# Patient Record
Sex: MALE | Race: WHITE
[De-identification: names, ages, dates, MRNs, and addresses within clinical notes are randomized per-mention and may not be internally consistent; named-entity substitution may affect disease eponyms.]

---

## 2021-05-31 ENCOUNTER — HOSPITAL ENCOUNTER (EMERGENCY)
Dept: HOSPITAL 75 - ER FS | Age: 51
Discharge: HOME | End: 2021-05-31
Payer: COMMERCIAL

## 2021-05-31 VITALS — BODY MASS INDEX: 45.1 KG/M2 | WEIGHT: 315 LBS | HEIGHT: 70 IN

## 2021-05-31 VITALS — SYSTOLIC BLOOD PRESSURE: 149 MMHG | DIASTOLIC BLOOD PRESSURE: 77 MMHG

## 2021-05-31 DIAGNOSIS — S61.240A: Primary | ICD-10-CM

## 2021-05-31 DIAGNOSIS — Z23: ICD-10-CM
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
## 2021-05-31 PROCEDURE — 64450 NJX AA&/STRD OTHER PN/BRANCH: CPT

## 2021-05-31 PROCEDURE — 90715 TDAP VACCINE 7 YRS/> IM: CPT

## 2021-05-31 NOTE — ED UPPER EXTREMITY
General


Chief Complaint:  Foreign Body


Stated Complaint:  RIGHT INDEX FINGER INJ


Nursing Triage Note:  


Patient ambulatory to ED with lodged fish hook in distal right index finger at 


1700.


Nursing Sepsis Screen:  No Definite Risk


Source:  patient





History of Present Illness


Date Seen by Provider:  May 31, 2021


Time Seen by Provider:  19:33


Initial Comments


50-year-old male presenting to the emergency department with complaints of 

fishhook caught in his right index finger.  He had been fishing but this when it

got caught.  He states his last tetanus shot was more than 5 years ago.  He is 

having pain to the index finger.  He is right-hand dominant.  He had this 

happened around 5 PM.  [place].  He has no numbness or tingling to that 

finger.  He has normal range of motion.  There is increased pain with palpation 

and movement especially of the fishhook


Onset:  just prior to arrival (Around 5 PM)


Severity:  moderate


Pain/Injury Location:  right 2nd finger


Modifying Factors:  Improves With Immobilization; Worse With Movement





Allergies and Home Medications


Allergies


Coded Allergies:  


     No Known Drug Allergies (Unverified , 5/31/21)





Home Medications


Cephalexin 500 Mg Capsule, 500 MG PO QID


   Prescribed by: ELIZABETH GASTON on 5/31/21 2028


Levofloxacin 750 Mg Tablet, 750 MG PO DAILY


   Prescribed by: ELIZABETH GASTON on 5/31/21 2028





Patient Home Medication List


Home Medication List Reviewed:  Yes





Review of Systems


Constitutional:  no symptoms reported


EENTM:  no symptoms reported


Respiratory:  no symptoms reported


Cardiovascular:  no symptoms reported


Gastrointestinal:  no symptoms reported


Genitourinary:  no symptoms reported


Musculoskeletal:  see HPI


Skin:  see HPI


Psychiatric/Neurological:  No Symptoms Reported; Denies Numbness, Denies 

Paresthesia





Past Medical-Social-Family Hx


Past Med/Social Hx:  Reviewed Nursing Past Med/Soc Hx


Patient Social History


Alcohol Use:  Denies Use


Smoking Status:  Never a Smoker


2nd Hand Smoke Exposure:  No


Recent Infectious Disease Expo:  No


Recent Hopitalizations:  No





Immunizations Up To Date


Tetanus Booster (TDap):  More than 5yrs





Seasonal Allergies


Seasonal Allergies:  No





Past Medical History


Surgeries:  Yes (Bilat knee replacements, Hernia repair X3, Cataracts)


Joint Replacement


Respiratory:  No


Cardiac:  No


Neurological:  No


Genitourinary:  No


Gastrointestinal:  No


Musculoskeletal:  No


Endocrine:  No


HEENT:  No


Cancer:  No


Psychosocial:  No


Integumentary:  No


Blood Disorders:  No





Physical Exam


Vital Signs





Vital Signs - First Documented








 5/31/21





 18:26


 


Temp 36.8


 


Pulse 87


 


Resp 16


 


B/P (MAP) 140/75 (96)


 


Pulse Ox 97


 


O2 Delivery Room Air





Capillary Refill : Less Than 3 Seconds


Height, Weight, BMI


Height: '"


Weight: lbs. oz. kg; 52.00 BMI


Method:


General Appearance:  WD/WN, no apparent distress


Hand:  soft tissue tenderness (With foreign body in the right index finger 

distal phalanx fingertip/pad)


Neurologic/Tendon:  normal sensation, normal motor functions, normal tendon 

functions


Neurologic/Psychiatric:  CNs II-XII nml as tested, no motor/sensory deficits, 

alert, normal mood/affect, oriented x 3


Skin:  normal color, warm/dry





Procedures/Interventions


I&D :  


   Site:  Right index finger


   Blade Size:  15


   I & D Procedure:  betadine prep, sterile dressing applied


Progress


After obtaining verbal consent from the patient a digital block was performed 

using 1% plain lidocaine.  A total of 5 mL of lidocaine were instilled in the 

proximal portion of the finger in a ring block fashion.  An additional 

milliliter of 1% plain lidocaine was also injected in the distal phalanx area of

the index finger.  The additional barbs on the three-pronged fishhook were 

covered with gauze to help protect them from causing further damage.  The finger

and area around the puncture site was cleaned using Betadine scrub and sterile 

water.  Then the needle  was used to get a hold of the fishhook.  The 15 

blade scalpel was used to make a small incision over the area of the j luis.  Then

using the 18-gauge needle from drying up his lidocaine in the j luis was covered. 

As the j luis on the fishhook was covered gentle traction was applied to remove 

the fishhook.  It came out easily without any difficulty.  Patient tolerated 

this well without any complications.  There is a small amount of bleeding from 

the incision site.  The area was cleaned with Betadine and a sterile dressing 

using antibiotic ointment and clean dry dressing was applied.  Given a first 

dose of antibiotics here and counseled on follow-up and return precautions.





Progress/Results/Core Measures


Results/Orders


My Orders





Orders - ELIZABETH GASTON MD


Dipht,Pertuss(Acell),Tet Adult (Boostrix (5/31/21 18:45)


Lidocaine 1% Inj 20 Ml (Xylocaine 1% Inj (5/31/21 18:45)


Cephalexin Capsule (Keflex Capsule) (5/31/21 20:22)


Levofloxacin  Tablet (Levaquin  Tablet) (5/31/21 20:22)


Wound Dressing-Ed (5/31/21 20:22)





Medications Given in ED





Current Medications








 Medications  Dose


 Ordered  Sig/Yuli


 Route  Start Time


 Stop Time Status Last Admin


Dose Admin


 


 Diphtheria/


 Tetanus/Acell


 Pertussis  0.5 ml  ONCE ONCE


 IM  5/31/21 18:45


 5/31/21 18:46 DC 5/31/21 18:51


0.5 ML


 


 Lidocaine HCl  20 ml  ONCE  ONCE


 INJ  5/31/21 18:45


 5/31/21 18:46 DC 5/31/21 18:52


20 ML








Vital Signs/I&O











 5/31/21 5/31/21





 18:26 20:34


 


Temp 36.8 


 


Pulse 87 69


 


Resp 16 17


 


B/P (MAP) 140/75 (96) 149/77


 


Pulse Ox 97 98


 


O2 Delivery Room Air Room Air














Blood Pressure Mean:                    96











Progress


Progress Note :  


Progress Note


Tetanus booster was updated.  His fishhook was removed after obtaining verbal 

consent and using a digital block and a small scalpel to expose the j luis on the 

hook.  The fishhook was removed without difficulty or any immediate 

complication.  Counseled on follow-up and return precautions.  As he had been 

using it and water will prescribe short course of antibiotics.  From reviewing 

up-to-date online medical reference recommendation for Keflex or clindamycin 

along with Levaquin would be the main first-line treatment.  Ordered initial 

dose here and sent prescriptions to [facility] on [address] in [place].


Counseled on follow-up and return precautions.





Departure


Impression





   Primary Impression:  


   Fish hook injury of right index finger


   Qualified Codes:  S69.91XA - Unspecified injury of right wrist, hand and 

   finger(s), initial encounter


Disposition:  01 HOME, SELF-CARE


Condition:  Stable





Departure-Patient Inst.


Decision time for Depature:  20:28


Referrals:  


NO,LOCAL PHYSICIAN (PCP/Family)


Primary Care Physician


Patient Instructions:  Common Finger Injuries ED, Foreign Body in Skin ED, 

Removal of Foreign Body in Skin





Add. Discharge Instructions:  


Take antibiotics to help prevent infection from the fish hook being stuck in 

your fingertip. 





Use Ibuprofen or Acetaminophen for pain.





Keep wound covered for first 24 hours then may wash with soap and water and 

cover the wound if it might get dirty





Check with your doctor if having worsening problems or you develop redness and 

swelling spreading up your finger and hand, fever over 101 F, or pus draining 

from finger tip





All discharge instructions reviewed with patient and/or family. Voiced 

understanding.


Scripts


Levofloxacin (Levofloxacin) 750 Mg Tablet


750 MG PO DAILY for fish hook injury for 5 Days, #5 TAB 0 Refills


   Prov: ELIZABETH GASTON MD         5/31/21 


Cephalexin (Cephalexin) 500 Mg Capsule


500 MG PO QID for fish hook injury for 5 Days, #20 CAP 0 Refills


   Prov: ELIZABETH GASTON MD         5/31/21











ELIZABETH GASTON MD               May 31, 2021 19:35